# Patient Record
Sex: MALE | Race: WHITE | NOT HISPANIC OR LATINO | URBAN - METROPOLITAN AREA
[De-identification: names, ages, dates, MRNs, and addresses within clinical notes are randomized per-mention and may not be internally consistent; named-entity substitution may affect disease eponyms.]

---

## 2020-08-19 ENCOUNTER — EMERGENCY (EMERGENCY)
Facility: HOSPITAL | Age: 60
LOS: 1 days | Discharge: HOME | End: 2020-08-19
Attending: EMERGENCY MEDICINE | Admitting: EMERGENCY MEDICINE
Payer: COMMERCIAL

## 2020-08-19 VITALS
SYSTOLIC BLOOD PRESSURE: 107 MMHG | DIASTOLIC BLOOD PRESSURE: 74 MMHG | OXYGEN SATURATION: 96 % | HEART RATE: 84 BPM | RESPIRATION RATE: 18 BRPM | TEMPERATURE: 98 F

## 2020-08-19 VITALS
SYSTOLIC BLOOD PRESSURE: 135 MMHG | HEIGHT: 67 IN | HEART RATE: 82 BPM | DIASTOLIC BLOOD PRESSURE: 88 MMHG | TEMPERATURE: 98 F | OXYGEN SATURATION: 99 % | RESPIRATION RATE: 18 BRPM | WEIGHT: 115.96 LBS
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
## 2020-08-19 DIAGNOSIS — M25.521 PAIN IN RIGHT ELBOW: ICD-10-CM

## 2020-08-19 DIAGNOSIS — S01.01XA LACERATION WITHOUT FOREIGN BODY OF SCALP, INITIAL ENCOUNTER: ICD-10-CM
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
## 2020-08-19 DIAGNOSIS — S82.51XA DISPLACED FRACTURE OF MEDIAL MALLEOLUS OF RIGHT TIBIA, INITIAL ENCOUNTER FOR CLOSED FRACTURE: ICD-10-CM
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
## 2020-08-19 DIAGNOSIS — Z23 ENCOUNTER FOR IMMUNIZATION: ICD-10-CM

## 2020-08-19 PROCEDURE — 27760 CLTX MEDIAL ANKLE FX: CPT

## 2020-08-19 PROCEDURE — 73590 X-RAY EXAM OF LOWER LEG: CPT | Mod: 26,RT

## 2020-08-19 PROCEDURE — 73070 X-RAY EXAM OF ELBOW: CPT | Mod: 26

## 2020-08-19 PROCEDURE — 12001 RPR S/N/AX/GEN/TRNK 2.5CM/<: CPT | Mod: XU

## 2020-08-19 PROCEDURE — 73600 X-RAY EXAM OF ANKLE: CPT

## 2020-08-19 PROCEDURE — 72125 CT NECK SPINE W/O DYE: CPT | Mod: 26

## 2020-08-19 PROCEDURE — 99284 EMERGENCY DEPT VISIT MOD MDM: CPT | Mod: 25

## 2020-08-19 PROCEDURE — 99285 EMERGENCY DEPT VISIT HI MDM: CPT | Mod: 25

## 2020-08-19 PROCEDURE — 29515 APPLICATION SHORT LEG SPLINT: CPT | Mod: 59

## 2020-08-19 PROCEDURE — 12001 RPR S/N/AX/GEN/TRNK 2.5CM/<: CPT

## 2020-08-19 PROCEDURE — 99283 EMERGENCY DEPT VISIT LOW MDM: CPT

## 2020-08-19 PROCEDURE — 70450 CT HEAD/BRAIN W/O DYE: CPT | Mod: 26,76

## 2020-08-19 PROCEDURE — 73610 X-RAY EXAM OF ANKLE: CPT | Mod: 26

## 2020-08-19 PROCEDURE — 73610 X-RAY EXAM OF ANKLE: CPT

## 2020-08-19 PROCEDURE — 90715 TDAP VACCINE 7 YRS/> IM: CPT

## 2020-08-19 PROCEDURE — 73600 X-RAY EXAM OF ANKLE: CPT | Mod: 26,59,RT

## 2020-08-19 PROCEDURE — 73070 X-RAY EXAM OF ELBOW: CPT

## 2020-08-19 PROCEDURE — 72125 CT NECK SPINE W/O DYE: CPT

## 2020-08-19 PROCEDURE — 73610 X-RAY EXAM OF ANKLE: CPT | Mod: 26,RT

## 2020-08-19 PROCEDURE — 73070 X-RAY EXAM OF ELBOW: CPT | Mod: 26,RT

## 2020-08-19 PROCEDURE — 70450 CT HEAD/BRAIN W/O DYE: CPT

## 2020-08-19 PROCEDURE — 73590 X-RAY EXAM OF LOWER LEG: CPT

## 2020-08-19 PROCEDURE — 90471 IMMUNIZATION ADMIN: CPT

## 2020-08-19 RX ORDER — ACETAMINOPHEN 500 MG
650 TABLET ORAL ONCE
Refills: 0 | Status: COMPLETED | OUTPATIENT
Start: 2020-08-19 | End: 2020-08-19

## 2020-08-19 RX ORDER — CEPHALEXIN 500 MG
500 CAPSULE ORAL ONCE
Refills: 0 | Status: COMPLETED | OUTPATIENT
Start: 2020-08-19 | End: 2020-08-19

## 2020-08-19 RX ORDER — CEPHALEXIN 500 MG
1 CAPSULE ORAL
Qty: 14 | Refills: 0
Start: 2020-08-19 | End: 2020-08-25

## 2020-08-19 RX ORDER — TETANUS TOXOID, REDUCED DIPHTHERIA TOXOID AND ACELLULAR PERTUSSIS VACCINE, ADSORBED 5; 2.5; 8; 8; 2.5 [IU]/.5ML; [IU]/.5ML; UG/.5ML; UG/.5ML; UG/.5ML
0.5 SUSPENSION INTRAMUSCULAR ONCE
Refills: 0 | Status: COMPLETED | OUTPATIENT
Start: 2020-08-19 | End: 2020-08-19

## 2020-08-19 RX ADMIN — TETANUS TOXOID, REDUCED DIPHTHERIA TOXOID AND ACELLULAR PERTUSSIS VACCINE, ADSORBED 0.5 MILLILITER(S): 5; 2.5; 8; 8; 2.5 SUSPENSION INTRAMUSCULAR at 09:13

## 2020-08-19 RX ADMIN — Medication 650 MILLIGRAM(S): at 15:30

## 2020-08-19 RX ADMIN — Medication 500 MILLIGRAM(S): at 11:46
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
## 2020-08-19 NOTE — ED PROVIDER NOTE - PHYSICAL EXAMINATION
Vitals reviewed  Gen: anxious appearing but nad, speaking in full sentences  Skin: wwp, abrasion to R elbow  HEENT: nc, 2cm lac to R parietal scalp- minimal oozing blood, does not penetrate through gallea, eomi, asymmetric reactive pupils (chronic as per pt), mmm  Neck: no midline ttp/step off, FROM  Back: no midline ttp/step off   CV: rrr, no audible m/r/g  Resp: symmetrical expansion, ctab, no w/r/r  Abd: nondistended, soft, nontender  RUE: minimal ttp over abrasion of elbow, no point avinash ttp, FROM all joints, 5/5 strength, radial pulse 2+  RLE: swelling and ecchymosis ankle w/ medial malleolar ttp, limited ROM ankle 2/2 pain, moves all toes/knee, no ttp of proxima tibia/fibula, DP/PT pulse 2+  FROM LUE/LLE without avinash ttp, distal pulses intact  Neuro: alert/oriented, no focal deficits Vitals reviewed  Gen: anxious appearing but nad, speaking in full sentences  Skin: wwp, abrasion to R elbow  HEENT: nc, 2cm lac to R parietal scalp- minimal oozing blood, eomi, asymmetric reactive pupils (chronic as per pt), mmm  Neck: no midline ttp/step off, FROM  Back: no midline ttp/step off   CV: rrr, no audible m/r/g  Resp: symmetrical expansion, ctab, no w/r/r  Abd: nondistended, soft, nontender  RUE: minimal ttp over abrasion of elbow, no point avinash ttp, FROM all joints, 5/5 strength, radial pulse 2+  RLE: swelling and ecchymosis ankle w/ medial malleolar ttp, limited ROM ankle 2/2 pain, moves all toes/knee, no ttp of proxima tibia/fibula, DP/PT pulse 2+  FROM LUE/LLE without avinash ttp, distal pulses intact  Neuro: alert/oriented, no focal deficits Vitals reviewed  Gen: anxious appearing but nad, speaking in full sentences  Skin: wwp, abrasion to R elbow  HEENT: nc, 2cm lac to R parietal scalp +hematoma and minimal oozing blood, eomi, asymmetric reactive pupils (chronic as per pt), mmm  Neck: no midline ttp/step off, FROM  Back: no midline ttp/step off   CV: rrr, no audible m/r/g  Resp: symmetrical expansion, ctab, no w/r/r  Abd: nondistended, soft, nontender  RUE: minimal ttp over abrasion of elbow, no point avinash ttp, FROM all joints, 5/5 strength, radial pulse 2+  RLE: swelling and ecchymosis ankle w/ medial malleolar ttp, limited ROM ankle 2/2 pain, moves all toes/knee, no ttp of proxima tibia/fibula, DP/PT pulse 2+  FROM LUE/LLE without avinash ttp, distal pulses intact  Neuro: alert/oriented, no focal deficits
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
## 2020-08-19 NOTE — ED PROVIDER NOTE - CARE PROVIDER_API CALL
Mazin Brown)  Orthopedics  [address] [place] [address]  Phone: [phone]  Fax: [phone]  Follow Up Time: Mazin Brown)  Orthopedics  [address] [place] [address]  Phone: [phone]  Fax: [phone]  Follow Up Time:     Mazin Kelly  NEUROSURGERY  [address] [address]  Phone: [phone]  Fax: [phone]  Follow Up Time:

## 2020-08-19 NOTE — ED PROVIDER NOTE - NSFOLLOWUPINSTRUCTIONS_ED_ALL_ED_FT
Please call to make appointment with orthopedic specialist within one week.  Keep wound clean and dry, do not scrub scalp.  Return to in ED in one week for staple removal    Take tylenol 650mg or motrin 400-800mg as needed every 4-6 hours for pain.   REST- Rest your hurting/injuried joint or extremity to decrease pain and swelling for 24-48 hours    ICE- Apply ice to area of pain to decreased inflammation and pain, put towel/barrier between ice and skin. You can keep ice on for 20 minutes at a time 4-8 times daily   COMPRESSION- Wear ace wrap or brace for support to reduce swelling.  Make sure not to wrap too tight, loosen if skin feeling numb/tingling or skin turns blue   ELEVATION- Elevate hurting/injured area 6 or more inches about level of heart to decrease swelling/inflammation.  Use pillow under joint to elevate area    Laceration    A laceration is a cut that goes through all of the layers of the skin and into the tissue that is right under the skin. Some lacerations heal on their own. Others need to be closed with skin adhesive strips, skin glue, stitches (sutures), or staples. Proper laceration care minimizes the risk of infection and helps the laceration to heal better.  If non-absorbable stitches or staples have been placed, they must be taken out within the time frame instructed by your healthcare provider.    SEEK IMMEDIATE MEDICAL CARE IF YOU HAVE ANY OF THE FOLLOWING SYMPTOMS: swelling around the wound, worsening pain, drainage from the wound, red streaking going away from your wound, inability to move finger or toe near the laceration, or discoloration of skin near the laceration.    Tibial Fracture, Adult     A tibial fracture is a break in the larger bone in the lower leg (tibia). This bone is also called the shin bone.  What are the causes?  This condition is caused by an injury to the leg, such as an injury from:  A fall.Contact sports.A motor vehicle accident.What increases the risk?  You are more likely to develop this condition if you:  Do activities or sports that involve jumping.Do activities or sports that involve doing the same movements over and over (repetitive stress), such as long-distance running.Have a loss of density in your bones (osteoporosis).Are older. Age increases the risk.What are the signs or symptoms?  Symptoms of this condition include:  Pain.Swelling.Bruising.Inability to put weight on the leg or use the leg to walk.The leg having an abnormal shape (deformity).Numbness or a pins-and-needles feeling in the feet. This may indicate a nerve injury.How is this diagnosed?  This condition may be diagnosed based on:  Your symptoms and medical history. A physical exam.You may also have other tests, including:  X-rays.A CT scan.MRI.How is this treated?  Treatment for this condition includes:  Wearing a cast, splint, or brace on your lower leg to keep it from moving while it heals (immobilization). You will wear your cast or splint until your health care provider thinks the bone has healed enough.Using crutches to avoid putting weight on the leg until your health care provider thinks the bone has healed enough.Doing physical therapy exercises to regain movement (range of motion) in your knee. You will start physical therapy after your cast or splint is removed.If the injury caused parts of the bone to move out of place, your health care provider may reposition the bone parts (closed reduction) before putting on your cast or splint. If you have a severe fracture, you may need surgery to place metal rods, plates, or screws into the bone to hold it in place.  Follow these instructions at home:  If you have a splint or brace:     Wear the splint or brace as told by your health care provider. Remove it only as told by your health care provider.Loosen it if your toes tingle, become numb, or turn cold and blue.Keep it clean and dry.If you have a cast:     Do not stick anything inside the cast to scratch your skin. Doing that increases your risk of infection.Check the skin around the cast every day. Tell your health care provider about any concerns. You may put lotion on dry skin around the edges of the cast. Do not put lotion on the skin underneath the cast. Keep the cast clean and dry.Bathing     Do not take baths, swim, or use a hot tub until your health care provider approves. Ask your health care provider if you may take showers. You may only be allowed to take sponge baths.If the splint, brace, or cast is not waterproof:  Do not let it get wet. Cover it with a watertight covering when you take a bath or a shower.Managing pain, stiffness, and swelling        If directed, put ice on the injured area:  If you have a removable splint, remove it as told by your health care provider.Put ice in a plastic bag.Place a towel between your skin and the bag, or between the cast and the bag.Leave the ice on for 20 minutes, 2–3 times a day.Move your toes often to avoid stiffness and to lessen swelling. Raise (elevate) your lower leg above the level of your heart while you are sitting or lying down.Activity     Do not use your leg to support your body weight until your health care provider says that you can. Follow weight-bearing restrictions and use crutches as directed.Ask your health care provider what activities are safe for you during recovery. Avoid activities as told by your health care provider.Do physical therapy exercises as directed.Driving     Do not drive until your health care provider approves. Do not drive or use heavy machinery while taking prescription pain medicine.Medicines     Ask your health care provider if you should take supplements of calcium and vitamin D to promote bone healing.Take over-the-counter and prescription medicines only as told by your health care provider. To prevent or treat constipation while you are taking prescription pain medicine, your health care provider may recommend that you:  Drink enough fluid to keep your urine pale yellow.Take over-the-counter or prescription medicines.Eat foods that are high in fiber, such as fresh fruits and vegetables, whole grains, and beans.Limit foods that are high in fat and processed sugars, such as fried or sweet foods.General instructions     Do not put pressure on any part of the cast or splint until it is fully hardened, if applicable. This may take several hours. Do not use any products that contain nicotine or tobacco, such as cigarettes and e-cigarettes. These can delay bone healing. If you need help quitting, ask your health care provider. Keep all follow-up visits as told by your health care provider. This is important.Contact a health care provider if you have:  Pain that gets worse or does not get better with medicine.Redness or swelling that gets worse.Numbness or tingling in your toes or foot.Get help right away if:  Your foot or toes feel cold or turn blue, even after loosening your splint or brace.You have severe pain.Summary  A tibial fracture is a break in the larger bone in your lower leg (tibia).This condition is caused by an injury to the leg, usually from a fall, a car accident, or a direct impact in contact sports.Treatment usually involves wearing a cast or splint and not putting weight on the leg until it is healed. Surgery is sometimes needed.Make sure you understand and follow your health care provider's home care instructions.This information is not intended to replace advice given to you by your health care provider. Make sure you discuss any questions you have with your health care provider. Please call to make appointment with orthopedic specialist tomorrow; call today to make appointment.  Keep wound clean and dry, do not scrub scalp.  Return to in ED in one week for staple removal    Take tylenol 650mg or motrin 400-800mg as needed every 4-6 hours for pain.   REST- Rest your hurting/injuried joint or extremity to decrease pain and swelling for 24-48 hours    ICE- Apply ice to area of pain to decreased inflammation and pain, put towel/barrier between ice and skin. You can keep ice on for 20 minutes at a time 4-8 times daily   COMPRESSION- Wear ace wrap or brace for support to reduce swelling.  Make sure not to wrap too tight, loosen if skin feeling numb/tingling or skin turns blue   ELEVATION- Elevate hurting/injured area 6 or more inches about level of heart to decrease swelling/inflammation.  Use pillow under joint to elevate area    Laceration    A laceration is a cut that goes through all of the layers of the skin and into the tissue that is right under the skin. Some lacerations heal on their own. Others need to be closed with skin adhesive strips, skin glue, stitches (sutures), or staples. Proper laceration care minimizes the risk of infection and helps the laceration to heal better.  If non-absorbable stitches or staples have been placed, they must be taken out within the time frame instructed by your healthcare provider.    SEEK IMMEDIATE MEDICAL CARE IF YOU HAVE ANY OF THE FOLLOWING SYMPTOMS: swelling around the wound, worsening pain, drainage from the wound, red streaking going away from your wound, inability to move finger or toe near the laceration, or discoloration of skin near the laceration.    Skull Fracture, Adult     A skull fracture is a break or crack in one of the bones that make up the skull. Skull fractures range in severity. A skull fracture is more severe if the bones move out of place after the fracture, or if the brain, spine, nerves, or nearby blood vessels are also injured. A skull fracture is an emergency and needs immediate medical attention.  What are the causes?  This condition is usually caused by a forceful injury to the head, such as from:  A fall.An assault.A hard, direct hit to the head.A car crash or a recreational vehicle crash.What are the signs or symptoms?  Symptoms of a skull fracture depend on what type of injury caused the fracture and how severe the injury is. Symptoms may include:  A headache.Pain, swelling, or an indent in one area of the head or scalp.Clear or bloody liquid leaking from the nose or ears.Blurred or double vision.Slurred speech.Nausea or vomiting.Bruising around the eyes or behind the ears.Weakness or numbness in the face or in one side or area of the body.A sudden loss of hearing or smell.Confusion.Trouble with balance or coordination.Seizures.How is this diagnosed?  This condition is diagnosed based on:  A physical exam and your medical history.Tests, such as:  CT scan.X-rays.MRI.A hearing test and an eye exam.A nerve test. This may be done to check for any damage to your facial nerves.If clear or bloody liquid is leaking from your nose or ears, it may be tested to check if it is cerebrospinal fluid, which is the type of fluid that surrounds the brain and spinal cord.  How is this treated?  Most skull fractures heal without treatment. If treatment is needed, it may include:  Observation and rest. You may be admitted to a hospital for close observation.Medicines. These may be given to relieve symptoms such as headaches, seizures, and nausea.Antibiotic medicines, if you have a scalp wound.If you have a severe fracture, you may need surgery to correct the position of any bones that have moved. Surgery may also be needed to treat injuries to other areas of the head, spine, and face.  Follow these instructions at home:  Medicines     Take over-the-counter and prescription medicines only as told by your health care provider.If you were prescribed an antibiotic medicine, take it as told by your health care provider. Do not stop taking the antibiotic even if you start to feel better.Wound care        Follow instructions from your health care provider about how to take care of your wound. Make sure you:  Wash your hands with soap and water before and after you change your bandage (dressing). If soap and water are not available, use hand .Change your dressing as told by your health care provider.If you have stitches (sutures), staples, skin glue, or adhesive strips, leave them in place. These skin closures may need to stay in place for 2 weeks or longer. If adhesive strip edges start to loosen and curl up, you may trim the loose edges. Do not remove adhesive strips completely unless your health care provider tells you to do that.Check your wound every day for signs of infection. Check for:  More redness, swelling, or pain.More fluid or blood.Warmth.Pus or a bad smell.Activity     Rest as told by your health care provider. Ask your health care provider when you can return to your normal activities. Do not lift anything that is heavier than 10 lb (4.5 kg), or the limit that you are told, until your health care provider says that it is safe.Do not drive or use heavy machinery until your health care provider says that it is safe.General instructions     If you were treated in the hospital, have someone stay with you when you go home. This person will need to watch you for a few days and make sure that you get medical care if you have problems. Ask your health care provider how long someone should stay with you.Do not drink alcohol until your health care provider says that you can.Do not blow your nose until your health care provider says that it is okay.Raise (elevate) your head above the level of your heart while you are lying down.Keep all follow-up visits as told by your health care provider. This is important.Contact a health care provider if:  You feel nauseous.You have ongoing (persistent) headaches that are not relieved by medicines.Your symptoms do not go away.Your wound appears infected or starts bleeding.Get help right away if:  You vomit more than once.You feel confused.You have trouble talking or walking.You have seizures.You are drowsy or have trouble waking up.You lose consciousness.Your eyes move back and forth rapidly.You have a severe headache.Your arms or legs do not move the way they should.Your pupils change size much more than they normally do.You have clear or bloody liquid coming from your nose or ears.These symptoms may represent a serious problem that is an emergency. Do not wait to see if the symptoms will go away. Get medical help right away. Call your local emergency services (911 in the [place] Do not drive yourself to the hospital.   Summary  A skull fracture is a break or crack in one of the bones that make up the skull. This condition is an emergency and needs immediate medical attention.A skull fracture is usually caused by a forceful injury to the head.Most skull fractures heal without treatment. If treatment is needed, it may include rest, medicines, and surgery.Keep all follow-up visits as told by your health care provider. This is important.This information is not intended to replace advice given to you by your health care provider. Make sure you discuss any questions you have with your health care provider.      Tibial Fracture, Adult     A tibial fracture is a break in the larger bone in the lower leg (tibia). This bone is also called the shin bone.  What are the causes?  This condition is caused by an injury to the leg, such as an injury from:  A fall.Contact sports.A motor vehicle accident.What increases the risk?  You are more likely to develop this condition if you:  Do activities or sports that involve jumping.Do activities or sports that involve doing the same movements over and over (repetitive stress), such as long-distance running.Have a loss of density in your bones (osteoporosis).Are older. Age increases the risk.What are the signs or symptoms?  Symptoms of this condition include:  Pain.Swelling.Bruising.Inability to put weight on the leg or use the leg to walk.The leg having an abnormal shape (deformity).Numbness or a pins-and-needles feeling in the feet. This may indicate a nerve injury.How is this diagnosed?  This condition may be diagnosed based on:  Your symptoms and medical history. A physical exam.You may also have other tests, including:  X-rays.A CT scan.MRI.How is this treated?  Treatment for this condition includes:  Wearing a cast, splint, or brace on your lower leg to keep it from moving while it heals (immobilization). You will wear your cast or splint until your health care provider thinks the bone has healed enough.Using crutches to avoid putting weight on the leg until your health care provider thinks the bone has healed enough.Doing physical therapy exercises to regain movement (range of motion) in your knee. You will start physical therapy after your cast or splint is removed.If the injury caused parts of the bone to move out of place, your health care provider may reposition the bone parts (closed reduction) before putting on your cast or splint. If you have a severe fracture, you may need surgery to place metal rods, plates, or screws into the bone to hold it in place.  Follow these instructions at home:  If you have a splint or brace:     Wear the splint or brace as told by your health care provider. Remove it only as told by your health care provider.Loosen it if your toes tingle, become numb, or turn cold and blue.Keep it clean and dry.If you have a cast:     Do not stick anything inside the cast to scratch your skin. Doing that increases your risk of infection.Check the skin around the cast every day. Tell your health care provider about any concerns. You may put lotion on dry skin around the edges of the cast. Do not put lotion on the skin underneath the cast. Keep the cast clean and dry.Bathing     Do not take baths, swim, or use a hot tub until your health care provider approves. Ask your health care provider if you may take showers. You may only be allowed to take sponge baths.If the splint, brace, or cast is not waterproof:  Do not let it get wet. Cover it with a watertight covering when you take a bath or a shower.Managing pain, stiffness, and swelling        If directed, put ice on the injured area:  If you have a removable splint, remove it as told by your health care provider.Put ice in a plastic bag.Place a towel between your skin and the bag, or between the cast and the bag.Leave the ice on for 20 minutes, 2–3 times a day.Move your toes often to avoid stiffness and to lessen swelling. Raise (elevate) your lower leg above the level of your heart while you are sitting or lying down.Activity     Do not use your leg to support your body weight until your health care provider says that you can. Follow weight-bearing restrictions and use crutches as directed.Ask your health care provider what activities are safe for you during recovery. Avoid activities as told by your health care provider.Do physical therapy exercises as directed.Driving     Do not drive until your health care provider approves. Do not drive or use heavy machinery while taking prescription pain medicine.Medicines     Ask your health care provider if you should take supplements of calcium and vitamin D to promote bone healing.Take over-the-counter and prescription medicines only as told by your health care provider. To prevent or treat constipation while you are taking prescription pain medicine, your health care provider may recommend that you:  Drink enough fluid to keep your urine pale yellow.Take over-the-counter or prescription medicines.Eat foods that are high in fiber, such as fresh fruits and vegetables, whole grains, and beans.Limit foods that are high in fat and processed sugars, such as fried or sweet foods.General instructions     Do not put pressure on any part of the cast or splint until it is fully hardened, if applicable. This may take several hours. Do not use any products that contain nicotine or tobacco, such as cigarettes and e-cigarettes. These can delay bone healing. If you need help quitting, ask your health care provider. Keep all follow-up visits as told by your health care provider. This is important.Contact a health care provider if you have:  Pain that gets worse or does not get better with medicine.Redness or swelling that gets worse.Numbness or tingling in your toes or foot.Get help right away if:  Your foot or toes feel cold or turn blue, even after loosening your splint or brace.You have severe pain.Summary  A tibial fracture is a break in the larger bone in your lower leg (tibia).This condition is caused by an injury to the leg, usually from a fall, a car accident, or a direct impact in contact sports.Treatment usually involves wearing a cast or splint and not putting weight on the leg until it is healed. Surgery is sometimes needed.Make sure you understand and follow your health care provider's home care instructions.This information is not intended to replace advice given to you by your health care provider. Make sure you discuss any questions you have with your health care provider. Please call to make appointment with orthopedic specialist tomorrow; call today to make appointment. Also call to make appointment with neurosurgery specialist.  Keep wound clean and dry, do not scrub scalp.  Return to in ED in one week for staple removal    Return to ED immediately if you experience fever, dizziness, fainting, vomiting, seizure activity, discharge from wound, numbness/weakness, in extremities, significant ankle pain/swelling or other concerns     Take tylenol 650mg or motrin 400-800mg as needed every 4-6 hours for pain.   REST- Rest your hurting/injuried joint or extremity to decrease pain and swelling for 24-48 hours    ICE- Apply ice to area of pain to decreased inflammation and pain, put towel/barrier between ice and skin. You can keep ice on for 20 minutes at a time 4-8 times daily   COMPRESSION- Wear ace wrap or brace for support to reduce swelling.  Make sure not to wrap too tight, loosen if skin feeling numb/tingling or skin turns blue   ELEVATION- Elevate hurting/injured area 6 or more inches about level of heart to decrease swelling/inflammation.  Use pillow under joint to elevate area    Laceration    A laceration is a cut that goes through all of the layers of the skin and into the tissue that is right under the skin. Some lacerations heal on their own. Others need to be closed with skin adhesive strips, skin glue, stitches (sutures), or staples. Proper laceration care minimizes the risk of infection and helps the laceration to heal better.  If non-absorbable stitches or staples have been placed, they must be taken out within the time frame instructed by your healthcare provider.    SEEK IMMEDIATE MEDICAL CARE IF YOU HAVE ANY OF THE FOLLOWING SYMPTOMS: swelling around the wound, worsening pain, drainage from the wound, red streaking going away from your wound, inability to move finger or toe near the laceration, or discoloration of skin near the laceration.    Skull Fracture, Adult     A skull fracture is a break or crack in one of the bones that make up the skull. Skull fractures range in severity. A skull fracture is more severe if the bones move out of place after the fracture, or if the brain, spine, nerves, or nearby blood vessels are also injured. A skull fracture is an emergency and needs immediate medical attention.  What are the causes?  This condition is usually caused by a forceful injury to the head, such as from:  A fall.An assault.A hard, direct hit to the head.A car crash or a recreational vehicle crash.What are the signs or symptoms?  Symptoms of a skull fracture depend on what type of injury caused the fracture and how severe the injury is. Symptoms may include:  A headache.Pain, swelling, or an indent in one area of the head or scalp.Clear or bloody liquid leaking from the nose or ears.Blurred or double vision.Slurred speech.Nausea or vomiting.Bruising around the eyes or behind the ears.Weakness or numbness in the face or in one side or area of the body.A sudden loss of hearing or smell.Confusion.Trouble with balance or coordination.Seizures.How is this diagnosed?  This condition is diagnosed based on:  A physical exam and your medical history.Tests, such as:  CT scan.X-rays.MRI.A hearing test and an eye exam.A nerve test. This may be done to check for any damage to your facial nerves.If clear or bloody liquid is leaking from your nose or ears, it may be tested to check if it is cerebrospinal fluid, which is the type of fluid that surrounds the brain and spinal cord.  How is this treated?  Most skull fractures heal without treatment. If treatment is needed, it may include:  Observation and rest. You may be admitted to a hospital for close observation.Medicines. These may be given to relieve symptoms such as headaches, seizures, and nausea.Antibiotic medicines, if you have a scalp wound.If you have a severe fracture, you may need surgery to correct the position of any bones that have moved. Surgery may also be needed to treat injuries to other areas of the head, spine, and face.  Follow these instructions at home:  Medicines     Take over-the-counter and prescription medicines only as told by your health care provider.If you were prescribed an antibiotic medicine, take it as told by your health care provider. Do not stop taking the antibiotic even if you start to feel better.Wound care        Follow instructions from your health care provider about how to take care of your wound. Make sure you:  Wash your hands with soap and water before and after you change your bandage (dressing). If soap and water are not available, use hand .Change your dressing as told by your health care provider.If you have stitches (sutures), staples, skin glue, or adhesive strips, leave them in place. These skin closures may need to stay in place for 2 weeks or longer. If adhesive strip edges start to loosen and curl up, you may trim the loose edges. Do not remove adhesive strips completely unless your health care provider tells you to do that.Check your wound every day for signs of infection. Check for:  More redness, swelling, or pain.More fluid or blood.Warmth.Pus or a bad smell.Activity     Rest as told by your health care provider. Ask your health care provider when you can return to your normal activities. Do not lift anything that is heavier than 10 lb (4.5 kg), or the limit that you are told, until your health care provider says that it is safe.Do not drive or use heavy machinery until your health care provider says that it is safe.General instructions     If you were treated in the hospital, have someone stay with you when you go home. This person will need to watch you for a few days and make sure that you get medical care if you have problems. Ask your health care provider how long someone should stay with you.Do not drink alcohol until your health care provider says that you can.Do not blow your nose until your health care provider says that it is okay.Raise (elevate) your head above the level of your heart while you are lying down.Keep all follow-up visits as told by your health care provider. This is important.Contact a health care provider if:  You feel nauseous.You have ongoing (persistent) headaches that are not relieved by medicines.Your symptoms do not go away.Your wound appears infected or starts bleeding.Get help right away if:  You vomit more than once.You feel confused.You have trouble talking or walking.You have seizures.You are drowsy or have trouble waking up.You lose consciousness.Your eyes move back and forth rapidly.You have a severe headache.Your arms or legs do not move the way they should.Your pupils change size much more than they normally do.You have clear or bloody liquid coming from your nose or ears.These symptoms may represent a serious problem that is an emergency. Do not wait to see if the symptoms will go away. Get medical help right away. Call your local emergency services (911 in the [place] Do not drive yourself to the hospital.   Summary  A skull fracture is a break or crack in one of the bones that make up the skull. This condition is an emergency and needs immediate medical attention.A skull fracture is usually caused by a forceful injury to the head.Most skull fractures heal without treatment. If treatment is needed, it may include rest, medicines, and surgery.Keep all follow-up visits as told by your health care provider. This is important.This information is not intended to replace advice given to you by your health care provider. Make sure you discuss any questions you have with your health care provider.      Tibial Fracture, Adult     A tibial fracture is a break in the larger bone in the lower leg (tibia). This bone is also called the shin bone.  What are the causes?  This condition is caused by an injury to the leg, such as an injury from:  A fall.Contact sports.A motor vehicle accident.What increases the risk?  You are more likely to develop this condition if you:  Do activities or sports that involve jumping.Do activities or sports that involve doing the same movements over and over (repetitive stress), such as long-distance running.Have a loss of density in your bones (osteoporosis).Are older. Age increases the risk.What are the signs or symptoms?  Symptoms of this condition include:  Pain.Swelling.Bruising.Inability to put weight on the leg or use the leg to walk.The leg having an abnormal shape (deformity).Numbness or a pins-and-needles feeling in the feet. This may indicate a nerve injury.How is this diagnosed?  This condition may be diagnosed based on:  Your symptoms and medical history. A physical exam.You may also have other tests, including:  X-rays.A CT scan.MRI.How is this treated?  Treatment for this condition includes:  Wearing a cast, splint, or brace on your lower leg to keep it from moving while it heals (immobilization). You will wear your cast or splint until your health care provider thinks the bone has healed enough.Using crutches to avoid putting weight on the leg until your health care provider thinks the bone has healed enough.Doing physical therapy exercises to regain movement (range of motion) in your knee. You will start physical therapy after your cast or splint is removed.If the injury caused parts of the bone to move out of place, your health care provider may reposition the bone parts (closed reduction) before putting on your cast or splint. If you have a severe fracture, you may need surgery to place metal rods, plates, or screws into the bone to hold it in place.  Follow these instructions at home:  If you have a splint or brace:     Wear the splint or brace as told by your health care provider. Remove it only as told by your health care provider.Loosen it if your toes tingle, become numb, or turn cold and blue.Keep it clean and dry.If you have a cast:     Do not stick anything inside the cast to scratch your skin. Doing that increases your risk of infection.Check the skin around the cast every day. Tell your health care provider about any concerns. You may put lotion on dry skin around the edges of the cast. Do not put lotion on the skin underneath the cast. Keep the cast clean and dry.Bathing     Do not take baths, swim, or use a hot tub until your health care provider approves. Ask your health care provider if you may take showers. You may only be allowed to take sponge baths.If the splint, brace, or cast is not waterproof:  Do not let it get wet. Cover it with a watertight covering when you take a bath or a shower.Managing pain, stiffness, and swelling        If directed, put ice on the injured area:  If you have a removable splint, remove it as told by your health care provider.Put ice in a plastic bag.Place a towel between your skin and the bag, or between the cast and the bag.Leave the ice on for 20 minutes, 2–3 times a day.Move your toes often to avoid stiffness and to lessen swelling. Raise (elevate) your lower leg above the level of your heart while you are sitting or lying down.Activity     Do not use your leg to support your body weight until your health care provider says that you can. Follow weight-bearing restrictions and use crutches as directed.Ask your health care provider what activities are safe for you during recovery. Avoid activities as told by your health care provider.Do physical therapy exercises as directed.Driving     Do not drive until your health care provider approves. Do not drive or use heavy machinery while taking prescription pain medicine.Medicines     Ask your health care provider if you should take supplements of calcium and vitamin D to promote bone healing.Take over-the-counter and prescription medicines only as told by your health care provider. To prevent or treat constipation while you are taking prescription pain medicine, your health care provider may recommend that you:  Drink enough fluid to keep your urine pale yellow.Take over-the-counter or prescription medicines.Eat foods that are high in fiber, such as fresh fruits and vegetables, whole grains, and beans.Limit foods that are high in fat and processed sugars, such as fried or sweet foods.General instructions     Do not put pressure on any part of the cast or splint until it is fully hardened, if applicable. This may take several hours. Do not use any products that contain nicotine or tobacco, such as cigarettes and e-cigarettes. These can delay bone healing. If you need help quitting, ask your health care provider. Keep all follow-up visits as told by your health care provider. This is important.Contact a health care provider if you have:  Pain that gets worse or does not get better with medicine.Redness or swelling that gets worse.Numbness or tingling in your toes or foot.Get help right away if:  Your foot or toes feel cold or turn blue, even after loosening your splint or brace.You have severe pain.Summary  A tibial fracture is a break in the larger bone in your lower leg (tibia).This condition is caused by an injury to the leg, usually from a fall, a car accident, or a direct impact in contact sports.Treatment usually involves wearing a cast or splint and not putting weight on the leg until it is healed. Surgery is sometimes needed.Make sure you understand and follow your health care provider's home care instructions.This information is not intended to replace advice given to you by your health care provider. Make sure you discuss any questions you have with your health care provider.

## 2020-08-19 NOTE — ED PROVIDER NOTE - PROVIDER TOKENS
PROVIDER:[TOKEN:[[id number]]] PROVIDER:[TOKEN:[[id number]]],PROVIDER:[TOKEN:[[id number]]]
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
## 2020-08-19 NOTE — ED PROVIDER NOTE - PATIENT PORTAL LINK FT
You can access the FollowMyHealth Patient Portal offered by [facility] by registering at the following website: [email or web address]. By joining [employer]’s FollowMyHealth portal, you will also be able to view your health information using other applications (apps) compatible with our system.

## 2020-08-19 NOTE — ED PROVIDER NOTE - CARE PROVIDERS DIRECT ADDRESSES
,DirectAddress_Unknown ,DirectAddress_Unknown,[email or web address]
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
## 2020-08-19 NOTE — ED PROVIDER NOTE - DIAGNOSTIC INTERPRETATION
ER PA: Liyah Howell  xray R ankle: minimally displaced medial malleolus, + soft tissue swelling, no dislocation noted  xray R elbow: no acute fracture/dislocation, no fat pad noted

## 2020-08-19 NOTE — ED ADULT NURSE NOTE - INTERVENTIONS DEFINITIONS
Monitor for mental status changes and reorient to person, place, and time/Monitor gait and stability/[place] to call system/Instruct patient to call for assistance/Physically safe environment: no spills, clutter or unnecessary equipment

## 2020-08-19 NOTE — ED PROVIDER NOTE - CARE PLAN
Principal Discharge DX:	Laceration of head  Secondary Diagnosis:	Closed displaced fracture of medial malleolus of right tibia, initial encounter
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
## 2020-08-20 ENCOUNTER — APPOINTMENT (OUTPATIENT)
Dept: ORTHOPEDIC SURGERY | Facility: CLINIC | Age: 60
End: 2020-08-20
Payer: COMMERCIAL

## 2020-08-20 DIAGNOSIS — Z80.9 FAMILY HISTORY OF MALIGNANT NEOPLASM, UNSPECIFIED: ICD-10-CM

## 2020-08-20 DIAGNOSIS — S82.891A OTHER FRACTURE OF RIGHT LOWER LEG, INITIAL ENCOUNTER FOR CLOSED FRACTURE: ICD-10-CM

## 2020-08-20 PROBLEM — Z00.00 ENCOUNTER FOR PREVENTIVE HEALTH EXAMINATION: Status: ACTIVE | Noted: 2020-08-20

## 2020-08-20 PROCEDURE — 99202 OFFICE O/P NEW SF 15 MIN: CPT
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
## 2020-08-21 PROBLEM — I10 ESSENTIAL (PRIMARY) HYPERTENSION: Chronic | Status: ACTIVE | Noted: 2020-08-19

## 2020-08-22 ENCOUNTER — EMERGENCY (EMERGENCY)
Facility: HOSPITAL | Age: 60
LOS: 1 days | Discharge: HOME | End: 2020-08-22
Attending: EMERGENCY MEDICINE | Admitting: EMERGENCY MEDICINE
Payer: COMMERCIAL

## 2020-08-22 VITALS
SYSTOLIC BLOOD PRESSURE: 129 MMHG | OXYGEN SATURATION: 98 % | TEMPERATURE: 98 F | HEART RATE: 65 BPM | DIASTOLIC BLOOD PRESSURE: 78 MMHG | RESPIRATION RATE: 16 BRPM

## 2020-08-22 VITALS
HEART RATE: 75 BPM | RESPIRATION RATE: 17 BRPM | SYSTOLIC BLOOD PRESSURE: 142 MMHG | HEIGHT: 67 IN | OXYGEN SATURATION: 98 % | WEIGHT: 115.96 LBS | DIASTOLIC BLOOD PRESSURE: 86 MMHG | TEMPERATURE: 98 F

## 2020-08-22 LAB
ALBUMIN SERPL ELPH-MCNC: 4 G/DL — SIGNIFICANT CHANGE UP (ref 3.3–5)
ALP SERPL-CCNC: 77 U/L — SIGNIFICANT CHANGE UP (ref 40–120)
ALT FLD-CCNC: 18 U/L — SIGNIFICANT CHANGE UP (ref 10–45)
ANION GAP SERPL CALC-SCNC: 9 MMOL/L — SIGNIFICANT CHANGE UP (ref 5–17)
APPEARANCE UR: CLEAR — SIGNIFICANT CHANGE UP
APTT BLD: 29.9 SEC — SIGNIFICANT CHANGE UP (ref 27.5–35.5)
AST SERPL-CCNC: 22 U/L — SIGNIFICANT CHANGE UP (ref 10–40)
BASOPHILS # BLD AUTO: 0.03 K/UL — SIGNIFICANT CHANGE UP (ref 0–0.2)
BASOPHILS NFR BLD AUTO: 0.4 % — SIGNIFICANT CHANGE UP (ref 0–2)
BILIRUB SERPL-MCNC: 0.4 MG/DL — SIGNIFICANT CHANGE UP (ref 0.2–1.2)
BILIRUB UR-MCNC: NEGATIVE — SIGNIFICANT CHANGE UP
BUN SERPL-MCNC: 14 MG/DL — SIGNIFICANT CHANGE UP (ref 7–23)
CALCIUM SERPL-MCNC: 9.6 MG/DL — SIGNIFICANT CHANGE UP (ref 8.4–10.5)
CHLORIDE SERPL-SCNC: 104 MMOL/L — SIGNIFICANT CHANGE UP (ref 96–108)
CO2 SERPL-SCNC: 26 MMOL/L — SIGNIFICANT CHANGE UP (ref 22–31)
COLOR SPEC: YELLOW — SIGNIFICANT CHANGE UP
CREAT SERPL-MCNC: 0.87 MG/DL — SIGNIFICANT CHANGE UP (ref 0.5–1.3)
DIFF PNL FLD: NEGATIVE — SIGNIFICANT CHANGE UP
EOSINOPHIL # BLD AUTO: 0.15 K/UL — SIGNIFICANT CHANGE UP (ref 0–0.5)
EOSINOPHIL NFR BLD AUTO: 2 % — SIGNIFICANT CHANGE UP (ref 0–6)
GLUCOSE SERPL-MCNC: 107 MG/DL — HIGH (ref 70–99)
GLUCOSE UR QL: NEGATIVE — SIGNIFICANT CHANGE UP
HCT VFR BLD CALC: 46 % — SIGNIFICANT CHANGE UP (ref 39–50)
HGB BLD-MCNC: 15 G/DL — SIGNIFICANT CHANGE UP (ref 13–17)
IMM GRANULOCYTES NFR BLD AUTO: 0.3 % — SIGNIFICANT CHANGE UP (ref 0–1.5)
INR BLD: 1.08 — SIGNIFICANT CHANGE UP (ref 0.88–1.16)
KETONES UR-MCNC: NEGATIVE — SIGNIFICANT CHANGE UP
LEUKOCYTE ESTERASE UR-ACNC: NEGATIVE — SIGNIFICANT CHANGE UP
LYMPHOCYTES # BLD AUTO: 1.36 K/UL — SIGNIFICANT CHANGE UP (ref 1–3.3)
LYMPHOCYTES # BLD AUTO: 17.9 % — SIGNIFICANT CHANGE UP (ref 13–44)
MCHC RBC-ENTMCNC: 31.8 PG — SIGNIFICANT CHANGE UP (ref 27–34)
MCHC RBC-ENTMCNC: 32.6 GM/DL — SIGNIFICANT CHANGE UP (ref 32–36)
MCV RBC AUTO: 97.5 FL — SIGNIFICANT CHANGE UP (ref 80–100)
MONOCYTES # BLD AUTO: 1.04 K/UL — HIGH (ref 0–0.9)
MONOCYTES NFR BLD AUTO: 13.7 % — SIGNIFICANT CHANGE UP (ref 2–14)
NEUTROPHILS # BLD AUTO: 5.01 K/UL — SIGNIFICANT CHANGE UP (ref 1.8–7.4)
NEUTROPHILS NFR BLD AUTO: 65.7 % — SIGNIFICANT CHANGE UP (ref 43–77)
NITRITE UR-MCNC: NEGATIVE — SIGNIFICANT CHANGE UP
NRBC # BLD: 0 /100 WBCS — SIGNIFICANT CHANGE UP (ref 0–0)
PH UR: 6.5 — SIGNIFICANT CHANGE UP (ref 5–8)
PLATELET # BLD AUTO: 206 K/UL — SIGNIFICANT CHANGE UP (ref 150–400)
POTASSIUM SERPL-MCNC: 5.4 MMOL/L — HIGH (ref 3.5–5.3)
POTASSIUM SERPL-SCNC: 5.4 MMOL/L — HIGH (ref 3.5–5.3)
PROT SERPL-MCNC: 7 G/DL — SIGNIFICANT CHANGE UP (ref 6–8.3)
PROT UR-MCNC: NEGATIVE MG/DL — SIGNIFICANT CHANGE UP
PROTHROM AB SERPL-ACNC: 12.9 SEC — SIGNIFICANT CHANGE UP (ref 10.6–13.6)
RBC # BLD: 4.72 M/UL — SIGNIFICANT CHANGE UP (ref 4.2–5.8)
RBC # FLD: 12 % — SIGNIFICANT CHANGE UP (ref 10.3–14.5)
SODIUM SERPL-SCNC: 139 MMOL/L — SIGNIFICANT CHANGE UP (ref 135–145)
SP GR SPEC: <=1.005 — SIGNIFICANT CHANGE UP (ref 1–1.03)
TROPONIN T SERPL-MCNC: <0.01 NG/ML — SIGNIFICANT CHANGE UP (ref 0–0.01)
UROBILINOGEN FLD QL: 0.2 E.U./DL — SIGNIFICANT CHANGE UP
WBC # BLD: 7.61 K/UL — SIGNIFICANT CHANGE UP (ref 3.8–10.5)
WBC # FLD AUTO: 7.61 K/UL — SIGNIFICANT CHANGE UP (ref 3.8–10.5)

## 2020-08-22 PROCEDURE — 70496 CT ANGIOGRAPHY HEAD: CPT | Mod: 26

## 2020-08-22 PROCEDURE — 36415 COLL VENOUS BLD VENIPUNCTURE: CPT

## 2020-08-22 PROCEDURE — 85610 PROTHROMBIN TIME: CPT

## 2020-08-22 PROCEDURE — 99284 EMERGENCY DEPT VISIT MOD MDM: CPT | Mod: 25

## 2020-08-22 PROCEDURE — 84484 ASSAY OF TROPONIN QUANT: CPT

## 2020-08-22 PROCEDURE — 85730 THROMBOPLASTIN TIME PARTIAL: CPT

## 2020-08-22 PROCEDURE — 93010 ELECTROCARDIOGRAM REPORT: CPT

## 2020-08-22 PROCEDURE — 80053 COMPREHEN METABOLIC PANEL: CPT

## 2020-08-22 PROCEDURE — 70498 CT ANGIOGRAPHY NECK: CPT

## 2020-08-22 PROCEDURE — 70496 CT ANGIOGRAPHY HEAD: CPT

## 2020-08-22 PROCEDURE — 93005 ELECTROCARDIOGRAM TRACING: CPT

## 2020-08-22 PROCEDURE — 70450 CT HEAD/BRAIN W/O DYE: CPT

## 2020-08-22 PROCEDURE — 70450 CT HEAD/BRAIN W/O DYE: CPT | Mod: 26,59

## 2020-08-22 PROCEDURE — 85025 COMPLETE CBC W/AUTO DIFF WBC: CPT

## 2020-08-22 PROCEDURE — 70498 CT ANGIOGRAPHY NECK: CPT | Mod: 26

## 2020-08-22 PROCEDURE — 81003 URINALYSIS AUTO W/O SCOPE: CPT

## 2020-08-22 PROCEDURE — 82962 GLUCOSE BLOOD TEST: CPT

## 2020-08-22 PROCEDURE — 99285 EMERGENCY DEPT VISIT HI MDM: CPT

## 2020-08-22 NOTE — ED PROVIDER NOTE - NSFOLLOWUPINSTRUCTIONS_ED_ALL_ED_FT
Follow up as scheduled        Post-Concussion Syndrome  Quick head movements causing injury to the brain.   Post-concussion syndrome is when symptoms last longer than normal after a head injury.  What are the signs or symptoms?  After a head injury, you may:  Have headaches.Feel tired.Feel dizzy.Feel weak.Have trouble seeing.Have trouble in bright lights.Have trouble hearing.Not be able to remember things.Not be able to focus.Have trouble sleeping.Have mood swings.Have trouble learning new things.These can last from weeks to months.  Follow these instructions at home:  Medicines     Take all medicines only as told by your doctor.Do not take prescription pain medicines.Activity     Limit activities as told by your doctor. This includes:  Homework.Job-related work.Thinking.Watching TV.Using a computer or phone.Puzzles.Exercise.Sports.Slowly return to your normal activity as told by your doctor.Stop an activity if you have symptoms.Do not do anything that may cause you to get injured again.General instructions     Rest. Try to:  Sleep 7–9 hours each night.Take naps or breaks when you feel tired during the day.Do not drink alcohol until your doctor says that you can.Keep track of your symptoms.Keep all follow-up visits as told by your doctor. This is important.Contact a doctor if:  You do not improve.You get worse.You have another injury.Get help right away if:  You have a very bad headache.You feel confused.You feel very sleepy.You pass out (faint).You throw up (vomit).You feel weak in any part of your body.You feel numb in any part of your body.You start shaking (have a seizure).You have trouble talking.Summary  Post-concussion syndrome is when symptoms last longer than normal after a head injury.Limit all activity after your injury. Gradually return to normal activity as told by your doctor.Rest, do not drink alcohol, and avoid prescription pain medicines after a concussion.Call your doctor if your symptoms get worse.This information is not intended to replace advice given to you by your health care provider. Make sure you discuss any questions you have with your health care provider.    Document Released: 01/25/2006 Document Revised: 10/10/2019 Document Reviewed: 01/22/2019  [employer] Patient Education © 2020 [employer]

## 2020-08-22 NOTE — ED PROVIDER NOTE - PATIENT PORTAL LINK FT
You can access the FollowMyHealth Patient Portal offered by [facility] by registering at the following website: [email or web address]. By joining [email or web address] [email or web address] portal, you will also be able to view your health information using other applications (apps) compatible with our system.

## 2020-08-22 NOTE — ED PROVIDER NOTE - EYES, MLM
L pupil dilated but otherwise clear bilaterally, round and reactive to light. pupils unequal. reactive to light and round.

## 2020-08-22 NOTE — ED ADULT NURSE NOTE - CHPI ED NUR SYMPTOMS NEG
no fever/no nausea/no numbness/no vomiting/no confusion/no weakness/no blurred vision/no loss of consciousness
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
## 2020-08-22 NOTE — ED PROVIDER NOTE - NEUROLOGICAL, MLM
AAO x 3, CN II-XII intact, normal speech, strength 5/5 bilateral upper and lower extremities, sensation intact, cerebellum intact, no ataxia, follows commands appropriately AAO x 3, CN II-XII intact, normal speech, strength 5/5 bilateral upper. sensation intact, cerebellum intact, follows commands appropriately
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
## 2020-08-22 NOTE — ED PROVIDER NOTE - MUSCULOSKELETAL, MLM
Splint in place to R lower extremity, R occipital region with sutures. No erythema, no discharge. Spine appears normal, range of motion is not limited, no muscle or joint tenderness Splint in place to R lower extremity, distal NVI. no distal swelling noted.   R occipital region with staples intact. No erythema, no discharge. Spine appears normal, range of motion is not limited, no muscle or joint tenderness

## 2020-08-24 ENCOUNTER — APPOINTMENT (OUTPATIENT)
Dept: NEUROSURGERY | Facility: CLINIC | Age: 60
End: 2020-08-24
Payer: COMMERCIAL

## 2020-08-24 VITALS
HEART RATE: 78 BPM | SYSTOLIC BLOOD PRESSURE: 124 MMHG | DIASTOLIC BLOOD PRESSURE: 79 MMHG | HEIGHT: 67 IN | OXYGEN SATURATION: 98 % | RESPIRATION RATE: 18 BRPM | TEMPERATURE: 99.3 F | BODY MASS INDEX: 17.74 KG/M2 | WEIGHT: 113 LBS

## 2020-08-24 DIAGNOSIS — Z87.01 PERSONAL HISTORY OF PNEUMONIA (RECURRENT): ICD-10-CM

## 2020-08-24 DIAGNOSIS — Z87.09 PERSONAL HISTORY OF OTHER DISEASES OF THE RESPIRATORY SYSTEM: ICD-10-CM

## 2020-08-24 DIAGNOSIS — Z86.79 PERSONAL HISTORY OF OTHER DISEASES OF THE CIRCULATORY SYSTEM: ICD-10-CM

## 2020-08-24 DIAGNOSIS — S02.91XA UNSPECIFIED FRACTURE OF SKULL, INITIAL ENCOUNTER FOR CLOSED FRACTURE: ICD-10-CM

## 2020-08-24 PROCEDURE — 99205 OFFICE O/P NEW HI 60 MIN: CPT

## 2020-08-25 PROBLEM — S02.91XA SKULL FRACTURE: Status: ACTIVE | Noted: 2020-08-25

## 2020-08-25 PROBLEM — Z87.09 HISTORY OF ASTHMA: Status: RESOLVED | Noted: 2020-08-20 | Resolved: 2020-08-25

## 2020-08-25 PROBLEM — Z87.01 HISTORY OF PNEUMONIA: Status: RESOLVED | Noted: 2020-08-25 | Resolved: 2020-08-25

## 2020-08-25 PROBLEM — Z86.79 HISTORY OF HYPERTENSION: Status: RESOLVED | Noted: 2020-08-20 | Resolved: 2020-08-25

## 2020-08-25 NOTE — DATA REVIEWED
[de-identified] : \par EXAM: CT BRAIN \par \par PROCEDURE DATE: 08/19/2020 \par \par \par \par INTERPRETATION: INDICATIONS: Right parietal/occipital fracture status post trauma; rule out intracranial hemorrhage \par \par TECHNIQUE: Serial axial images were obtained from the skull base to the vertex without the use of intravenous contrast. Sagittal and coronal reformats were also reviewed. \par \par COMPARISON EXAMINATION: CT head dated 8/19/2020 at 9:34 AM \par \par FINDINGS: \par VENTRICLES AND SULCI: Age appropriate parenchymal volume. No hydrocephalus. \par INTRA-AXIAL: No mass effect, acute hemorrhage, or midline shift. There is preservation of gray-white matter differentiation without CT evidence of acute transcortical infarction. \par EXTRA-AXIAL: No extra-axial fluid collection. \par VISUALIZED SINUSES: Imaged portions of paranasal sinuses are well aerated. \par VISUALIZED MASTOIDS: Well-aerated. \par CALVARIUM: There is again a nondisplaced fracture of the right parietal bone with extension to the base of the right occipital bone. \par MISCELLANEOUS: Interval placement of right parietal scalp staples at site of scalp swelling and laceration. \par \par IMPRESSION: \par 1. No acute intracranial hemorrhage. \par 2. Stable findings of acute, nondisplaced and nondepressed fracture of the right parietal and occipital bone. \par \par \par \par \par Thank you for the opportunity to participate in the care of this patient. \par \par PANCHITO FAIRCHILD M.D., RADIOLOGY RESIDENT \par This document has been electronically signed. \par ROSEANN AWNA M.D., ATTENDING RADIOLOGIST \par This document has been electronically signed. Aug 19 2020 4:25PM \par \par \par \par \par
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
## 2020-08-25 NOTE — PHYSICAL EXAM
[General Appearance - Alert] : alert [General Appearance - In No Acute Distress] : in no acute distress [Dry] : dry [Clean] : clean [Intact] : intact [No Drainage] : without drainage [Normal Skin] : normal [Oriented To Time, Place, And Person] : oriented to person, place, and time [Cranial Nerves Optic (II)] : visual acuity intact bilaterally,  pupils equal round and reactive to light [Cranial Nerves Oculomotor (III)] : extraocular motion intact [Cranial Nerves Facial (VII)] : face symmetrical [Cranial Nerves Trigeminal (V)] : facial sensation intact symmetrically [Cranial Nerves Vestibulocochlear (VIII)] : hearing was intact bilaterally [Cranial Nerves Glossopharyngeal (IX)] : tongue and palate midline [Cranial Nerves Accessory (XI - Cranial And Spinal)] : head turning and shoulder shrug symmetric [Motor Tone] : muscle tone was normal in all four extremities [Cranial Nerves Hypoglossal (XII)] : there was no tongue deviation with protrusion [Motor Strength] : muscle strength was normal in all four extremities [Sclera] : the sclera and conjunctiva were normal [Outer Ear] : the ears and nose were normal in appearance [Neck Appearance] : the appearance of the neck was normal [] : no respiratory distress [Heart Rate And Rhythm] : heart rate was normal and rhythm regular [Abnormal Walk] : normal gait [Skin Color & Pigmentation] : normal skin color and pigmentation [FreeTextEntry1] : RIGHT posterior head [FreeTextEntry7] : staples removed without difficulty

## 2020-08-25 NOTE — ASSESSMENT
[FreeTextEntry1] : CT head from 8/22/2020 reviewed by Dr. Kelly, demonstrates nondisplaced right parietal and right occipital skull fracture.\par No neurosurgical intervention indicated. \par Discussed post concussive symptoms including dizziness can improve over time. If symptoms or persist or worsen, recommend consultation with neurologist. \par Return to the office or notify MD if experience any new or worsening neurological s/s.\par \par Patient verbalizes agreement and understanding with plan of care.\par

## 2020-08-26 DIAGNOSIS — R42 DIZZINESS AND GIDDINESS: ICD-10-CM

## 2020-08-27 ENCOUNTER — TRANSCRIPTION ENCOUNTER (OUTPATIENT)
Age: 60
End: 2020-08-27

## 2020-08-27 ENCOUNTER — APPOINTMENT (OUTPATIENT)
Dept: ORTHOPEDIC SURGERY | Facility: AMBULATORY SURGERY CENTER | Age: 60
End: 2020-08-27

## 2020-08-28 ENCOUNTER — OUTPATIENT (OUTPATIENT)
Dept: OUTPATIENT SERVICES | Facility: HOSPITAL | Age: 60
LOS: 1 days | Discharge: HOME | End: 2020-08-28
Payer: COMMERCIAL

## 2020-08-28 PROCEDURE — 27766 OPTX MEDIAL ANKLE FX: CPT | Mod: RT

## 2020-08-28 RX ORDER — OXYCODONE AND ACETAMINOPHEN 5; 325 MG/1; MG/1
5-325 TABLET ORAL
Qty: 42 | Refills: 0 | Status: ACTIVE | COMMUNITY
Start: 2020-08-28 | End: 1900-01-01

## 2020-09-10 ENCOUNTER — APPOINTMENT (OUTPATIENT)
Dept: ORTHOPEDIC SURGERY | Facility: CLINIC | Age: 60
End: 2020-09-10
Payer: COMMERCIAL

## 2020-09-10 DIAGNOSIS — S82.891A OTHER FRACTURE OF RIGHT LOWER LEG, INITIAL ENCOUNTER FOR CLOSED FRACTURE: ICD-10-CM

## 2020-09-10 PROCEDURE — 99024 POSTOP FOLLOW-UP VISIT: CPT

## 2020-09-10 NOTE — HISTORY OF PRESENT ILLNESS
[___ Weeks Post Op] : [unfilled] weeks post op [Clean/Dry/Intact] : clean, dry and intact [Healed] : healed [Vascular Intact] : ~T peripheral vascular exam normal [Neuro Intact] : an unremarkable neurological exam [Doing Well] : is doing well [Excellent Pain Control] : has excellent pain control [Sutures Removed] : sutures were removed [No Sign of Infection] : is showing no signs of infection [Discharge] : absent of discharge [Erythema] : not erythematous [Swelling] : not swollen [de-identified] : ORIF R ankle done on 08/28/20. Presenting for 2-week post-op visit. Doing well, no pain. Took occasional oxy since the surgery but stopped yesterday.\par \par  [Dehiscence] : not dehisced [de-identified] : He is minimally swollen and not complaining of significant tenderness on palpation. No drainage. No signs of infection.  [de-identified] : He is doing well s/p ORIF 2 weeks prior.  [de-identified] : Continued NWB RLE, placed in cam boot. Encouraged to gently range ankle to limit stiffness. PT script given. F/u 4 weeks.

## 2020-10-22 ENCOUNTER — APPOINTMENT (OUTPATIENT)
Dept: ORTHOPEDIC SURGERY | Facility: CLINIC | Age: 60
End: 2020-10-22
Payer: COMMERCIAL

## 2020-10-22 PROCEDURE — 99024 POSTOP FOLLOW-UP VISIT: CPT

## 2020-10-22 NOTE — HISTORY OF PRESENT ILLNESS
[de-identified] : no interval history. pt has been using scooter and boot to get around. no new injuries or medical issues. is otherwise doing well.  [de-identified] : General: well appearing in NAD\par \par LLE: \par scar well healed. \par swelling noted laterality \par dorsiflexion to 35 and platar 40 without pain active and passive \par diminished sensation over surgical site otherwise intact \par 5/5 EHL/FHL/TA/GS \par palpable DP pulse  [de-identified] : medial mal healed with hardware in place  [de-identified] : pt is doing well post op with occasional swelling about the ankle with prolonged periods of walk/standing with no other symptoms. due to clinic exam and new interval XRs at this point pt can be WBAT in normal shoe with only limits being those things that elicit pain or weird sensations.